# Patient Record
Sex: FEMALE | URBAN - METROPOLITAN AREA
[De-identification: names, ages, dates, MRNs, and addresses within clinical notes are randomized per-mention and may not be internally consistent; named-entity substitution may affect disease eponyms.]

---

## 2020-04-16 ENCOUNTER — OFFICE VISIT (OUTPATIENT)
Dept: URBAN - METROPOLITAN AREA CLINIC 88 | Facility: CLINIC | Age: 35
End: 2020-04-16

## 2020-04-16 DIAGNOSIS — Z3A.22 22 WEEKS GESTATION OF PREGNANCY: ICD-10-CM

## 2020-04-16 DIAGNOSIS — H46.8 OTHER OPTIC NEURITIS: Primary | ICD-10-CM

## 2020-04-16 PROCEDURE — 92250 FUNDUS PHOTOGRAPHY W/I&R: CPT | Performed by: OPHTHALMOLOGY

## 2020-04-16 PROCEDURE — 99203 OFFICE O/P NEW LOW 30 MIN: CPT | Performed by: OPHTHALMOLOGY

## 2020-04-16 ASSESSMENT — INTRAOCULAR PRESSURE
OD: 13
OS: 13

## 2020-04-16 NOTE — IMPRESSION/PLAN
Impression: Other optic neuritis: H46.8. Plan: Discussed diagnosis in detail with patient. Will request MRI of Brain from ER. Baseline Fundus Photos OU today.  I do recommend return in for a follow up exam.